# Patient Record
Sex: FEMALE | Race: WHITE | Employment: OTHER | URBAN - METROPOLITAN AREA
[De-identification: names, ages, dates, MRNs, and addresses within clinical notes are randomized per-mention and may not be internally consistent; named-entity substitution may affect disease eponyms.]

---

## 2017-02-20 ENCOUNTER — TELEPHONE (OUTPATIENT)
Age: 67
End: 2017-02-20

## 2017-03-29 ENCOUNTER — NURSE ONLY (OUTPATIENT)
Age: 67
End: 2017-03-29

## 2017-03-29 DIAGNOSIS — M81.0 OSTEOPOROSIS, POSTMENOPAUSAL: Primary | ICD-10-CM

## 2017-04-06 ENCOUNTER — NURSE ONLY (OUTPATIENT)
Age: 67
End: 2017-04-06

## 2017-04-06 DIAGNOSIS — M81.0 OSTEOPOROSIS, POSTMENOPAUSAL: ICD-10-CM

## 2017-04-06 PROCEDURE — 96372 THER/PROPH/DIAG INJ SC/IM: CPT | Performed by: INTERNAL MEDICINE

## 2017-10-02 ENCOUNTER — TELEPHONE (OUTPATIENT)
Age: 67
End: 2017-10-02

## 2017-10-09 ENCOUNTER — HOSPITAL ENCOUNTER (OUTPATIENT)
Dept: GENERAL RADIOLOGY | Age: 67
Discharge: HOME | End: 2017-10-09
Attending: INTERNAL MEDICINE | Admitting: INTERNAL MEDICINE

## 2017-10-09 ENCOUNTER — TELEPHONE (OUTPATIENT)
Dept: ENDOCRINOLOGY | Age: 67
End: 2017-10-09

## 2017-10-09 ENCOUNTER — OFFICE VISIT (OUTPATIENT)
Age: 67
End: 2017-10-09

## 2017-10-09 VITALS
BODY MASS INDEX: 30.35 KG/M2 | SYSTOLIC BLOOD PRESSURE: 116 MMHG | DIASTOLIC BLOOD PRESSURE: 72 MMHG | HEIGHT: 64 IN | WEIGHT: 177.8 LBS

## 2017-10-09 DIAGNOSIS — Z51.81 MEDICATION MONITORING ENCOUNTER: ICD-10-CM

## 2017-10-09 DIAGNOSIS — N20.0 KIDNEY STONE: ICD-10-CM

## 2017-10-09 DIAGNOSIS — M81.0 OSTEOPOROSIS, POSTMENOPAUSAL: Primary | ICD-10-CM

## 2017-10-09 DIAGNOSIS — E55.9 VITAMIN D DEFICIENCY: ICD-10-CM

## 2017-10-09 DIAGNOSIS — M81.0 OSTEOPOROSIS, POSTMENOPAUSAL: ICD-10-CM

## 2017-10-09 PROCEDURE — 77080 DXA BONE DENSITY AXIAL: CPT | Performed by: INTERNAL MEDICINE

## 2017-10-09 PROCEDURE — 99214 OFFICE O/P EST MOD 30 MIN: CPT | Performed by: INTERNAL MEDICINE

## 2017-10-09 NOTE — PROGRESS NOTES
[facility]) Osteoporosis and [address] [place] [address]., [address] [place] [address]   Phone [phone]  Fax [phone]    NAME: Diann Baker   : 1950   STUDY DATE: 10/09/2017     REFERRING PROVIDER: Denver Coleman MD    INDICATION(S) FOR PERFORMING THE STUDY:  osteoporosis, age-related (M81.0)    CLINICAL INFORMATION PROVIDED BY THE PATIENT: 45-year-old woman. She started natural menopause at age 49. No history of fragility fractures. She takes Arimidex (started ). She took Fosamax in , Boniva -2011 and alendronate tabs  and liquid  but had GI side effects. She is being treated with Prolia (started 2013). EQUIPMENT: Hologic Discovery. POSITIONING: Good   REGIONS OF INTEREST: Correct. ARTIFACTS: None. STUDY VALID? L2 and L4 were deleted because of degenerative change. T-scores  Initial study: 2005* spine L1-L2 -1.0 Lowest hip (right total hip) -1.9   Current study: 10/09/2017 spine L1+L3 -1.5 Lowest hip (right fem. neck) -1,9     The table below shows bone mineral density (grams/cm2), the appropriate measure for comparing serial scans. An increase or decrease is significant based on precision studies done at our center according to the ISCD protocol. PA spine Proximal Femur (right)   Date L1+L3 Fem. neck Trochanter Total hip   2005* NA 0.596 0.505 0.656   2007* Invalid 0.621 0.540 0.729   2009* Invalid 0.596 0.539 0.717   06/15/2011* Invalid 0.563 0.544 0.721   2012 0.738 0.544 0.591 0.701   2013 0.759 0.571 0.600 0.707   2014 0.768 0.603 0.621 0.737   2015 0.846 0.595 0.628 0.750   2016 0.825 0.617 0.630 0.768   10/09/2017       *Done with [employer] equipment, BMD converted to Hologic units    IMPRESSION:  BONE DENSITY IS LOW. BETWEEN  AND , BMD INCREASED IN THE TROCHANTER BUT DID NOT CHANGE SIGNIFICANTLY IN THE SPINE, FEMORAL NECK OR TOTAL HIP.  SINCE  IT INCREASED AT ALL SITES MEASURED. Consider repeating this study in 1-2 years to assess the patient's progress. _________________________________________________   Macario Robles MD, Director, [facility]) Osteoporosis and [address] [place] [address]

## 2017-10-09 NOTE — PROGRESS NOTES
[address]  [address] Suite [facility] [place] [address]  Phone [phone]  Fax [phone]    PATIENT NAME: Mona Sarmiento  YOB: 1950  INITIAL CONSULTATION: 11/02/2011  MOST RECENT VISIT: 09/26/2016  TODAYS VISIT:  10/09/2017. Labs @  06/2017     PROBLEMS:   Osteoporosis by DXA 06/2011, lowest T-score -2.5 in the right femoral neck    Low bone density by DXA 07/2005, lowest T-score -2.2 in the right femoral neck    BMD decreased 6714-0157, may be due to a technical error    No fractures    Natural menopause age 49 (2001)  Breast cancer diagnosed 2004, surgery, chemotherapy, radiation, Arimidex started 2005  Kidney stones x 5, first episode ~ age 51 (57), last episode ~ 2003     [address]:   Calcium: 900 mg/d diet + ~300 mg in multivitamin = ~1200 mg daily  Vitamin D:  2000 IU daily per Dr. Amauri Mancilla    25-OH D 36 ng/mL 05/2015  Exercise: walks 3 x weekly  Pharmacologic therapy: Prolia 60 mg SQ twice yearly started 08/2013    PREVIOUS MEDICATIONS FOR OSTEOPOROSIS:   Fosamax, Fosamax D 8151-6136, changed to convenient dosing  Boniva 2005-06/2011, recommended to stop by oncologist  Ergocalciferol 50,000 IU, started \"years ago, mostly monthly dosing, changed to cholecalciferol 2000 IU/d 04/2012 by Dr. Amauri Mancilla  Alendronate 70 mg weekly 11/2011-02/2012, stopped because of GI symptoms  Tried alendronate 70 mg tabs dissolved in water, 04/2012, 3 doses => GI problems    OTHER CURRENT MEDICATIONS (SELECTED):  Lovastatin, Prilosec, Arimidex, gabapentin, bupropion, Singulair  OTC MEDICATIONS:  Folic acid, ASA    CHIEF COMPLAINT:  Here for f/u of osteoporosis, use of aromatase inhibitor and kidney stones, monitoring treatment. No new related signs or symptoms. INTERVAL HISTORY:  See problem list for chronic/inactive conditions. She received Prolia without side effects. No falls, near-falls or fractures. Feels well overall.  No 0.8.  05/2015, PTH 15. 07/2015, RFP Ca 10.2, Cr 0.72. 05/2016 Ca 9.7 Cr 0.8. 08/2016 CBC Ca 9.4 Cr 0.8.  06/2017 Ca 10.0 Cr 0.9. Imaging. DXA printouts reviewed. 07/2015 CXR. 05/2017 LX XR.    ASSESSMENT:  Osteoporosis with bone density lower than desirable due in part to calcium malabsorption. Importantly she has had no fractures. She has been on Arimidex since 2005 and will remain on therapy was some time. She has not been able to tolerate oral meds for osteoporosis. BMD has been stable but considering use of Prilosec and Arimidex fracture risk is higher than estimated by [facility]. She is doing well with Prolia started 08/2013. PLANS:  OK to give Prolia today and continue Prolia 60 mg SQ twice yearly. Return with DXA in 1 year. I spent a total of 25 minutes face to face with this patient. Over 50% of that time was spent on counseling and care coordination. Please see assessment and plan for counseling and care coordination details. Kelli Robles MD, Director, [facility] [place]) Osteoporosis and Bone Health Services    CC: MD Inga Nichols MD

## 2017-10-11 ENCOUNTER — NURSE ONLY (OUTPATIENT)
Age: 67
End: 2017-10-11

## 2017-10-11 DIAGNOSIS — M81.0 OSTEOPOROSIS, POSTMENOPAUSAL: ICD-10-CM

## 2017-10-11 PROCEDURE — 96372 THER/PROPH/DIAG INJ SC/IM: CPT | Performed by: INTERNAL MEDICINE

## 2017-10-12 ENCOUNTER — PROCEDURE VISIT (OUTPATIENT)
Age: 67
End: 2017-10-12

## 2017-10-12 DIAGNOSIS — M81.0 OSTEOPOROSIS, POSTMENOPAUSAL: Primary | ICD-10-CM

## 2018-03-21 ENCOUNTER — TELEPHONE (OUTPATIENT)
Age: 68
End: 2018-03-21

## 2018-04-05 DIAGNOSIS — M81.0 OSTEOPOROSIS, POSTMENOPAUSAL: Primary | ICD-10-CM

## 2018-04-12 ENCOUNTER — TELEPHONE (OUTPATIENT)
Age: 68
End: 2018-04-12

## 2018-04-12 ENCOUNTER — NURSE ONLY (OUTPATIENT)
Age: 68
End: 2018-04-12

## 2018-04-12 DIAGNOSIS — M81.0 OSTEOPOROSIS, POSTMENOPAUSAL: Primary | ICD-10-CM

## 2018-04-12 PROCEDURE — 96372 THER/PROPH/DIAG INJ SC/IM: CPT | Performed by: INTERNAL MEDICINE

## 2018-09-28 DIAGNOSIS — M81.0 OSTEOPOROSIS, POSTMENOPAUSAL: Primary | ICD-10-CM

## 2018-10-15 ENCOUNTER — HOSPITAL ENCOUNTER (OUTPATIENT)
Dept: GENERAL RADIOLOGY | Age: 68
Discharge: HOME | End: 2018-10-15
Payer: MEDICARE

## 2018-10-15 ENCOUNTER — OFFICE VISIT (OUTPATIENT)
Dept: ENDOCRINOLOGY | Age: 68
End: 2018-10-15
Payer: COMMERCIAL

## 2018-10-15 ENCOUNTER — PROCEDURE VISIT (OUTPATIENT)
Dept: ENDOCRINOLOGY | Age: 68
End: 2018-10-15
Payer: COMMERCIAL

## 2018-10-15 VITALS
HEIGHT: 64 IN | DIASTOLIC BLOOD PRESSURE: 67 MMHG | SYSTOLIC BLOOD PRESSURE: 121 MMHG | BODY MASS INDEX: 28.24 KG/M2 | RESPIRATION RATE: 16 BRPM | HEART RATE: 90 BPM | OXYGEN SATURATION: 98 % | WEIGHT: 165.4 LBS

## 2018-10-15 DIAGNOSIS — M81.0 OSTEOPOROSIS, POSTMENOPAUSAL: Primary | ICD-10-CM

## 2018-10-15 DIAGNOSIS — M81.0 OSTEOPOROSIS, POSTMENOPAUSAL: ICD-10-CM

## 2018-10-15 DIAGNOSIS — N20.0 KIDNEY STONE: ICD-10-CM

## 2018-10-15 DIAGNOSIS — E55.9 VITAMIN D DEFICIENCY: ICD-10-CM

## 2018-10-15 DIAGNOSIS — Z51.81 MEDICATION MONITORING ENCOUNTER: ICD-10-CM

## 2018-10-15 PROCEDURE — 77080 DXA BONE DENSITY AXIAL: CPT | Performed by: INTERNAL MEDICINE

## 2018-10-15 PROCEDURE — 96372 THER/PROPH/DIAG INJ SC/IM: CPT | Performed by: INTERNAL MEDICINE

## 2018-10-15 PROCEDURE — 99214 OFFICE O/P EST MOD 30 MIN: CPT | Performed by: INTERNAL MEDICINE

## 2018-10-15 PROCEDURE — 77080 DXA BONE DENSITY AXIAL: CPT

## 2018-10-15 NOTE — PROGRESS NOTES
[address]  [address] [place] [address]  Phone [phone]  Fax [phone]    PATIENT NAME: Nicky TORRES  YOB: 1950  INITIAL CONSULTATION: 11/02/2011  MOST RECENT VISIT: 10/09/2017  TODAYS VISIT:  10/15/2018     Labs @  07/2018    PROBLEMS:   Osteoporosis by DXA 06/2011, lowest T-score -2.5 in the right femoral neck    Low bone density by DXA 07/2005, lowest T-score -2.2 in the right femoral neck    BMD decreased 0250-7301, may be due to a technical error    No fractures    Natural menopause age 49 (2001)  Breast cancer diagnosed 2004, surgery, chemotherapy, radiation, Arimidex started 2005  Kidney stones x 5, first episode ~ age 51 (57), last episode ~ 2003     [address]:   Calcium: 900 mg/d diet + ~300 mg in multivitamin = ~1200 mg daily  Vitamin D:  2000 IU daily per Dr. Brody Solares    25-OH D 51 ng/mL 10/2017  Exercise: walks 3 x weekly  Pharmacologic therapy: Prolia 60 mg SQ twice yearly started 08/2013    PREVIOUS MEDICATIONS FOR OSTEOPOROSIS:   Fosamax, Fosamax D 2570-1329, changed to convenient dosing  Boniva 2005-06/2011, recommended to stop by oncologist  Ergocalciferol 50,000 IU, started \"years ago, mostly monthly dosing, changed to cholecalciferol 2000 IU/d 04/2012 by Dr. Brody Solares  Alendronate 70 mg weekly 11/2011-02/2012, stopped because of GI symptoms  Tried alendronate 70 mg tabs dissolved in water, 04/2012, 3 doses => GI problems    OTHER CURRENT MEDICATIONS (SELECTED):  Lovastatin, Prilosec, Arimidex, gabapentin, bupropion, Singulair  OTC MEDICATIONS:  Folic acid, ASA    CHIEF COMPLAINT:  Here for f/u of osteoporosis, use of aromatase inhibitor and kidney stones, monitoring treatment. No new related signs or symptoms. INTERVAL HISTORY:  See problem list for chronic/inactive conditions. She received Prolia without side effects. No falls, near-falls or fractures. Feels well overall.  No new

## 2018-10-15 NOTE — PROGRESS NOTES
SINCE 2013, BEFORE STARTING PROLIA, BMD IS HIGHER NOW AT ALL SITES MEASURED. Consider repeating this study in 1-2 years to assess the patient's progress. _________________________________________________   Juan Robles MD, Director, [facility]) Osteoporosis and [address] [place] [address]

## 2019-03-29 ENCOUNTER — TELEPHONE (OUTPATIENT)
Dept: ENDOCRINOLOGY | Age: 69
End: 2019-03-29

## 2019-04-09 ENCOUNTER — TELEPHONE (OUTPATIENT)
Dept: ENDOCRINOLOGY | Age: 69
End: 2019-04-09

## 2019-04-09 NOTE — TELEPHONE ENCOUNTER
Patient said that [facility] called her and told her that they have not received an order for the prolia. Patient asked to be called.

## 2019-04-16 DIAGNOSIS — M81.0 OSTEOPOROSIS, POSTMENOPAUSAL: Primary | ICD-10-CM

## 2019-04-17 ENCOUNTER — NURSE ONLY (OUTPATIENT)
Dept: ENDOCRINOLOGY | Age: 69
End: 2019-04-17
Payer: COMMERCIAL

## 2019-04-17 DIAGNOSIS — M81.0 OSTEOPOROSIS, POSTMENOPAUSAL: ICD-10-CM

## 2019-04-17 PROCEDURE — 96372 THER/PROPH/DIAG INJ SC/IM: CPT | Performed by: INTERNAL MEDICINE

## 2019-04-17 NOTE — PATIENT INSTRUCTIONS
Informed patient if any signs of redness,rash,swelling or unusual symptoms occur, please contact the office. Prolia given per physician order. Patient supplied the Prolia. It is the patient's responsibility to notify the physician office of new insurance plan prior to appointment to prevent delay in treatment. Please send a copy of the front and back of the insurance card either by fax, mail or stop by the office.   [address] [phone] Lot [id number] exp 6/21  [facility]

## 2019-04-17 NOTE — PROGRESS NOTES
Informed patient if any signs of redness,rash,swelling or unusual symptoms occur, please contact the office. Prolia given per physician order. Patient supplied the Prolia. It is the patient's responsibility to notify the physician office of new insurance plan prior to appointment to prevent delay in treatment. Please send a copy of the front and back of the insurance card either by fax, mail or stop by the office.   [address] [phone] Lot [id number] exp 6/21  [facility]

## 2019-10-21 ENCOUNTER — PROCEDURE VISIT (OUTPATIENT)
Dept: ENDOCRINOLOGY | Age: 69
End: 2019-10-21
Payer: COMMERCIAL

## 2019-10-21 ENCOUNTER — TELEPHONE (OUTPATIENT)
Dept: ENDOCRINOLOGY | Age: 69
End: 2019-10-21

## 2019-10-21 ENCOUNTER — OFFICE VISIT (OUTPATIENT)
Dept: ENDOCRINOLOGY | Age: 69
End: 2019-10-21
Payer: COMMERCIAL

## 2019-10-21 ENCOUNTER — HOSPITAL ENCOUNTER (OUTPATIENT)
Dept: GENERAL RADIOLOGY | Age: 69
Discharge: HOME | End: 2019-10-21
Payer: MEDICARE

## 2019-10-21 VITALS
SYSTOLIC BLOOD PRESSURE: 118 MMHG | DIASTOLIC BLOOD PRESSURE: 66 MMHG | BODY MASS INDEX: 27.96 KG/M2 | HEIGHT: 64 IN | WEIGHT: 163.8 LBS

## 2019-10-21 DIAGNOSIS — N20.0 KIDNEY STONE: ICD-10-CM

## 2019-10-21 DIAGNOSIS — M81.0 OSTEOPOROSIS, POSTMENOPAUSAL: ICD-10-CM

## 2019-10-21 DIAGNOSIS — E55.9 VITAMIN D DEFICIENCY: ICD-10-CM

## 2019-10-21 DIAGNOSIS — M81.0 OSTEOPOROSIS, POSTMENOPAUSAL: Primary | ICD-10-CM

## 2019-10-21 DIAGNOSIS — Z51.81 MEDICATION MONITORING ENCOUNTER: ICD-10-CM

## 2019-10-21 PROCEDURE — 99214 OFFICE O/P EST MOD 30 MIN: CPT | Performed by: INTERNAL MEDICINE

## 2019-10-21 PROCEDURE — 96372 THER/PROPH/DIAG INJ SC/IM: CPT | Performed by: INTERNAL MEDICINE

## 2019-10-21 PROCEDURE — 77080 DXA BONE DENSITY AXIAL: CPT

## 2019-10-21 PROCEDURE — 77080 DXA BONE DENSITY AXIAL: CPT | Performed by: INTERNAL MEDICINE

## 2020-04-23 ENCOUNTER — NURSE ONLY (OUTPATIENT)
Dept: ENDOCRINOLOGY | Age: 70
End: 2020-04-23
Payer: COMMERCIAL

## 2020-04-23 PROCEDURE — 96372 THER/PROPH/DIAG INJ SC/IM: CPT | Performed by: INTERNAL MEDICINE

## 2020-09-01 RX ORDER — DENOSUMAB 60 MG/ML
60 INJECTION SUBCUTANEOUS ONCE
Qty: 1 ML | Refills: 0 | Status: SHIPPED | OUTPATIENT
Start: 2020-09-01 | End: 2020-09-01

## 2020-10-27 ENCOUNTER — HOSPITAL ENCOUNTER (OUTPATIENT)
Dept: GENERAL RADIOLOGY | Age: 70
Discharge: HOME | End: 2020-10-27
Payer: MEDICARE

## 2020-10-27 ENCOUNTER — PROCEDURE VISIT (OUTPATIENT)
Dept: ENDOCRINOLOGY | Age: 70
End: 2020-10-27

## 2020-10-27 ENCOUNTER — OFFICE VISIT (OUTPATIENT)
Dept: ENDOCRINOLOGY | Age: 70
End: 2020-10-27
Payer: MEDICARE

## 2020-10-27 VITALS
WEIGHT: 163.6 LBS | DIASTOLIC BLOOD PRESSURE: 60 MMHG | SYSTOLIC BLOOD PRESSURE: 100 MMHG | HEIGHT: 63 IN | BODY MASS INDEX: 28.99 KG/M2 | TEMPERATURE: 97.7 F

## 2020-10-27 PROCEDURE — 77080 DXA BONE DENSITY AXIAL: CPT

## 2020-10-27 PROCEDURE — 99214 OFFICE O/P EST MOD 30 MIN: CPT | Performed by: INTERNAL MEDICINE

## 2020-10-27 PROCEDURE — 77080 DXA BONE DENSITY AXIAL: CPT | Performed by: INTERNAL MEDICINE

## 2020-10-27 RX ORDER — DENOSUMAB 60 MG/ML
60 INJECTION SUBCUTANEOUS ONCE
Qty: 1 ML | Refills: 0 | Status: SHIPPED | OUTPATIENT
Start: 2020-10-27 | End: 2021-04-07

## 2020-10-27 RX ORDER — BUPROPION HYDROCHLORIDE 150 MG/1
TABLET ORAL
COMMUNITY
Start: 2020-09-11

## 2020-10-27 NOTE — RESULT ENCOUNTER NOTE
[facility]) Osteoporosis and [address] [facility] [address], [address] [place] [address]   Phone [phone]  Fax [phone]    NAME: Jenny Robbins   : 1950   STUDY DATE: 10/27/2020     REFERRING PROVIDER: Alcides Boggs MD    INDICATION(S) FOR PERFORMING THE STUDY:  osteoporosis, age-related (M81.0)    CLINICAL INFORMATION PROVIDED BY THE PATIENT: 81-year-old woman. She started natural menopause at age 49. No history of fragility fractures. She takes Arimidex (started ). She took Fosamax in , Boniva -2011 and alendronate tabs  and liquid  but had GI side effects. She is being treated with Prolia (started 2013). EQUIPMENT: H[employer] Discovery. POSITIONING: Good   REGIONS OF INTEREST: Correct. ARTIFACTS: None. STUDY VALID? L2 and L4 were deleted because of degenerative change. T-scores  Initial study: 2005* spine L1-L2 -2.4 Lowest hip (right total hip) -1.9   Current study: 10/27/2020 spine L1-L2 -0.9 Lowest hip (right fem. neck) -1.9     The table below shows bone mineral density (grams/cm2), the appropriate measure for comparing serial scans. An increase or decrease is significant based on precision studies done at our center according to the ISCD protocol. PA spine Proximal Femur (right)   Date L1-L2 Fem. neck Trochanter Total hip   2005* 0.762 0.596 0.505 0.656   2007* 0.794 0.621 0.540 0.729   2009* 0.753 0.596 0.539 0.717   06/15/2011* 0.811 0.563 0.544 0.721   2012 0.751 0.544 0.591 0.701   2013 0.723 0.571 0.600 0.707   2014 0.755 0.603 0.621 0.737   2015 0.850 0.595 0.628 0.750   2016 0.785 0.617 0.630 0.768   10/09/2017 0.809 0.634 0.669 0.780   10/15/2018 0.859 0.622 0.637 0.747   10/21/2019 0.845 0.644 0.640 0.748   10/27/2020 0.882 0.635 0.666 0.772   *Done with [employer] equipment, BMD converted to Hologic units    IMPRESSION:  BONE DENSITY IS LOW.   SINCE , BEFORE STARTING PROLIA, BMD IS HIGHER NOW AT ALL SITES MEASURED. SINCE THE PREVIOUS DXA, BMD INCREASED IN THE SPINE AND IS TRENDING UP IN THE TROCHANTER AND FEMORAL NECK. Consider repeating this study in 1-2 years to assess the patient's progress. _________________________________________________   Jet Robles MD, Director, [facility]) Osteoporosis and [address] [place] [address]

## 2020-10-27 NOTE — PROGRESS NOTES
[address] [facility]  [address] [facility] [place] [address]  Phone [phone]  Fax [phone]    PATIENT NAME: Bishop Dangelo TORRES  YOB: 1950  INITIAL CONSULTATION: 11/02/2011  MOST RECENT VISIT: 10/21/2019  TODAYS VISIT:  10/27/2020    Labs @  08/2020    PROBLEMS:   Osteoporosis by DXA 06/2011, lowest T-score -2.5 in the right femoral neck    Low bone density by DXA 07/2005, lowest T-score -2.2 in the right femoral neck    BMD decreased 6934-7350, may be due to a technical error    No fractures    Natural menopause age 49 (2001)  Breast cancer diagnosed 2004, surgery, chemotherapy, radiation, Arimidex 2005~2017  Kidney stones x 5, first episode ~ age 51 (1999), last episode ~ 2003 11/2011 U Ca 289* oxalate 20, citrate 1005 uric acid 663*     CURRENT MANAGEMENT FOR BONE HEALTH:   Calcium: 900 mg/d diet + ~300 mg in multivitamin = ~1200 mg daily  Vitamin D:  2000 IU daily per Dr. Susan Barlow    25-OH D 51 ng/mL 10/2017  Exercise: walks 3 x weekly  Pharmacologic therapy: Prolia 60 mg SQ twice yearly started 08/2013    PREVIOUS MEDICATIONS FOR OSTEOPOROSIS:   Fosamax, Fosamax D 6596-9736, changed to convenient dosing  Boniva 2005-06/2011, recommended to stop by oncologist  Ergocalciferol 50,000 IU, started \"years ago, mostly monthly dosing, changed to cholecalciferol 2000 IU/d 04/2012 by Dr. Susan Barlow  Alendronate 70 mg weekly 11/2011-02/2012, stopped because of GI symptoms  Tried alendronate 70 mg tabs dissolved in water, 04/2012, 3 doses => GI problems    OTHER CURRENT MEDICATIONS (SELECTED):  Lovastatin, Prilosec, Arimidex, gabapentin, bupropion, Singulair  OTC MEDICATIONS:  Folic acid, ASA    CHIEF COMPLAINT:  Here for f/u of osteoporosis, use of aromatase inhibitor and kidney stones, monitoring treatment. No new related signs or symptoms. INTERVAL HISTORY:  See problem list for chronic/inactive conditions. She received Prolia without side effects.  She fell (again) in tight quarters in a parking lot but no injury. Feels well overall. She has not passed any more kidney stones. FOR FULL DETAILS OF FAMILY HISTORY, PAST MEDICAL AND SURGICAL HISTORY, SOCIAL HISTORY, AND REVIEW OF SYSTEMS, SEE PATIENT QUESTIONNAIRE OF TODAY'S DATE. PHYSICAL EXAMINATION:  NEUROLOGIC EXAM: Able to rise from chair without using arms. No apparent focal motor or sensory deficit. Reflexes brisk and symmetric. Coordination appears normal.  MUSCULOSKELETAL EXAM: Gait: Intact without difficulty. Steady without assistance. Spine: Spinal contours are normal.  No spine tenderness to palpation or percussion. Ribs and pelvis: Ribs appear normal. Pelvis appears normal. Two finger spaces between ribs and pelvis. BONE DENSITY:  Most recent done here using Hologic equipment. T-scores  Initial study: 07/12/2005* spine L1-L2 -2.4 Lowest hip (right total hip) -1.9   Current study: 10/27/2020 spine L1-L2 -0.9 Lowest hip (right fem. neck) -1.9     The table below shows bone mineral density (grams/cm2), the appropriate measure for comparing serial scans. An increase or decrease is significant based on precision studies done at our center according to the ISCD protocol. PA spine Proximal Femur (right)   Date L1-L2 Fem. neck Trochanter Total hip   07/12/2005* 0.762 0.596 0.505 0.656   07/17/2007* 0.794 0.621 0.540 0.729   07/01/2009* 0.753 0.596 0.539 0.717   06/15/2011* 0.811 0.563 0.544 0.721   07/31/2012 0.751 0.544 0.591 0.701   08/19/2013 0.723 0.571 0.600 0.707   08/25/2014 0.755 0.603 0.621 0.737   08/31/2015 0.850 0.595 0.628 0.750   09/26/2016 0.785 0.617 0.630 0.768   10/09/2017 0.809 0.634 0.669 0.780   10/15/2018 0.859 0.622 0.637 0.747   10/21/2019 0.845 0.644 0.640 0.748   10/27/2020 0.882 0.635 0.666 0.772   *Done with [employer] equipment, BMD converted to Hologic units    IMPRESSION:  BONE DENSITY IS LOW.   SINCE 2013, BEFORE STARTING PROLIA, BMD IS HIGHER NOW AT ALL SITES MEASURED. SINCE THE PREVIOUS DXA, BMD INCREASED IN THE SPINE AND IS TRENDING UP IN THE TROCHANTER AND FEMORAL NECK. Labs:  11/2011,  UOX 20 VOL 1700 mL UCa 289.  07/2012 Ca 9.8, Cr 0.7, TSH 0.8.  05/2015, PTH 15. 07/2015, RFP Ca 10.2, Cr 0.72. 05/2016 Ca 9.7 Cr 0.8. 08/2016 CBC Ca 9.4 Cr 0.8.  06/2017 Ca 10.0 Cr 0.9. 07/2018 Ca 10.0 Cr 0.8. 03/2019 PTH 15. 08/2019 Ca 9.7 Cr 0.6.   08/2020 Ca 9.3 Cr 0.7. Imaging. DXA printouts reviewed. 07/2015 CXR. 05/2017 LX XR.    ASSESSMENT:  Osteoporosis with bone density lower than desirable due in part to calcium malabsorption. Importantly she has had no fractures. She has been on Arimidex since 2005 and will remain on therapy was some time. She has not been able to tolerate oral meds for osteoporosis. She is doing well with Prolia started 08/2013. PLANS:  Continue Prolia 60 mg SQ twice yearly. Advised to maintain high fluid intake. Return with DXA in 1 year. I spent a total of 25 minutes face to face with this patient. Over 50% of that time was spent on counseling and care coordination. Please see assessment and plan for counseling and care coordination details. Rose Robles MD, Director, [facility]) Osteoporosis and Bone Health Services    CC: Casper Callaway MD

## 2020-11-19 ENCOUNTER — NURSE ONLY (OUTPATIENT)
Dept: ENDOCRINOLOGY | Age: 70
End: 2020-11-19
Payer: MEDICARE

## 2020-11-19 ENCOUNTER — TELEPHONE (OUTPATIENT)
Dept: ENDOCRINOLOGY | Age: 70
End: 2020-11-19

## 2020-11-19 PROCEDURE — 96372 THER/PROPH/DIAG INJ SC/IM: CPT | Performed by: INTERNAL MEDICINE

## 2020-11-19 NOTE — TELEPHONE ENCOUNTER
[facility] Prolia [facility]. She is asking if we have received it yet.

## 2021-04-07 RX ORDER — DENOSUMAB 60 MG/ML
INJECTION SUBCUTANEOUS
Qty: 1 SYRINGE | Refills: 1 | Status: SHIPPED | OUTPATIENT
Start: 2021-04-07

## 2021-05-20 ENCOUNTER — NURSE ONLY (OUTPATIENT)
Dept: ENDOCRINOLOGY | Age: 71
End: 2021-05-20
Payer: MEDICARE

## 2021-05-20 DIAGNOSIS — M81.0 AGE-RELATED OSTEOPOROSIS WITHOUT CURRENT PATHOLOGICAL FRACTURE: Primary | ICD-10-CM

## 2021-05-20 PROCEDURE — 96372 THER/PROPH/DIAG INJ SC/IM: CPT | Performed by: INTERNAL MEDICINE

## 2021-10-11 ENCOUNTER — TELEPHONE (OUTPATIENT)
Dept: ENDOCRINOLOGY | Age: 71
End: 2021-10-11

## 2021-10-12 RX ORDER — DENOSUMAB 60 MG/ML
60 INJECTION SUBCUTANEOUS ONCE
Qty: 1 EACH | Refills: 0 | Status: SHIPPED | OUTPATIENT
Start: 2021-10-12 | End: 2021-10-12

## 2021-11-24 ENCOUNTER — NURSE ONLY (OUTPATIENT)
Dept: ENDOCRINOLOGY | Age: 71
End: 2021-11-24
Payer: MEDICARE

## 2021-11-24 DIAGNOSIS — M81.0 OSTEOPOROSIS, POSTMENOPAUSAL: ICD-10-CM

## 2021-11-24 PROCEDURE — 96372 THER/PROPH/DIAG INJ SC/IM: CPT | Performed by: INTERNAL MEDICINE

## 2021-11-24 NOTE — PROGRESS NOTES
Prolia injection given to Dr. Sanaz Kumar  [address]. [address] [phone]. Lot # [id number]  Exp     04/24       Injection given in right      Arm. Patient supplied prolia. Patient advised to wait in office 20 minutes in case of reaction such as SOB, rash, hives, itching.

## 2021-11-24 NOTE — PATIENT INSTRUCTIONS
It is the patient's responsibility to notify the physician office of new insurance plan prior to appointment to prevent delay in treatment. Please send a copy of the front and back of the insurance card either by fax, mail or stop by the office. Prolia injection given to Dr. Pat Linder  Patient. [address] [phone]. Lot # [id number]  Exp     04/24       Injection given in right      Arm. Patient supplied prolia. Patient advised to wait in office 20 minutes in case of reaction such as SOB, rash, hives, itching.

## 2021-11-24 NOTE — PROGRESS NOTES
It is the patient's responsibility to notify the physician office of new insurance plan prior to appointment to prevent delay in treatment. Please send a copy of the front and back of the insurance card either by fax, mail or stop by the office.

## 2021-12-13 ENCOUNTER — HOSPITAL ENCOUNTER (OUTPATIENT)
Dept: GENERAL RADIOLOGY | Age: 71
Discharge: HOME | End: 2021-12-13
Payer: MEDICARE

## 2021-12-13 ENCOUNTER — PROCEDURE VISIT (OUTPATIENT)
Dept: ENDOCRINOLOGY | Age: 71
End: 2021-12-13

## 2021-12-13 ENCOUNTER — OFFICE VISIT (OUTPATIENT)
Dept: ENDOCRINOLOGY | Age: 71
End: 2021-12-13
Payer: MEDICARE

## 2021-12-13 VITALS
DIASTOLIC BLOOD PRESSURE: 75 MMHG | BODY MASS INDEX: 28.56 KG/M2 | WEIGHT: 161.2 LBS | HEIGHT: 63 IN | SYSTOLIC BLOOD PRESSURE: 118 MMHG

## 2021-12-13 DIAGNOSIS — N20.0 KIDNEY STONE: ICD-10-CM

## 2021-12-13 DIAGNOSIS — M81.0 OSTEOPOROSIS, POSTMENOPAUSAL: ICD-10-CM

## 2021-12-13 DIAGNOSIS — Z51.81 MEDICATION MONITORING ENCOUNTER: ICD-10-CM

## 2021-12-13 DIAGNOSIS — M81.0 OSTEOPOROSIS, POSTMENOPAUSAL: Primary | ICD-10-CM

## 2021-12-13 DIAGNOSIS — E55.9 VITAMIN D DEFICIENCY: ICD-10-CM

## 2021-12-13 PROCEDURE — 77080 DXA BONE DENSITY AXIAL: CPT | Performed by: INTERNAL MEDICINE

## 2021-12-13 PROCEDURE — 77080 DXA BONE DENSITY AXIAL: CPT

## 2021-12-13 PROCEDURE — 99214 OFFICE O/P EST MOD 30 MIN: CPT | Performed by: INTERNAL MEDICINE

## 2021-12-13 NOTE — PROGRESS NOTES
[address]  [address], [address] [place] [address]  Phone [phone]  Fax [phone]    NAME: Parth Adorno OF BIRTH: 1950  INITIAL CONSULTATION: 11/02/2011  MOST RECENT VISIT: 10/27/2020  TODAYS VISIT:  12/13/2021    Labs @  11/2021    PROBLEMS:   Osteoporosis by DXA 06/2011, lowest T-score -2.5 in the right femoral neck    Low bone density by DXA 07/2005, lowest T-score -2.2 in the right femoral neck    BMD decreased 6733-8832, may be due to a technical error    No fractures    Natural menopause age 49 (2001)  Breast cancer diagnosed 2004, surgery, chemotherapy, radiation, Arimidex 2005~2017  Kidney stones x 5, first episode ~ age 51 (1999), last episode ~ 2003 11/2011 U Ca 289* oxalate 20, citrate 1005 uric acid 663*     CURRENT MANAGEMENT FOR BONE HEALTH:   Calcium: 900 mg/d diet + ~300 mg in multivitamin = ~1200 mg daily  Vitamin D:  2000 IU daily per Dr. Karri Simons    25-OH D 51 ng/mL 10/2017  Exercise: walks 3 x weekly  Pharmacologic therapy: Prolia 60 mg SQ twice yearly started 08/2013    PREVIOUS MEDICATIONS FOR OSTEOPOROSIS:   Fosamax, Fosamax D 3544-0653, changed to convenient dosing  Boniva 2005-06/2011, recommended to stop by oncologist  Ergocalciferol 50,000 IU, started \"years ago, mostly monthly dosing, changed to cholecalciferol 2000 IU/d 04/2012 by Dr. Karri Simons  Alendronate 70 mg weekly 11/2011-02/2012, stopped because of GI symptoms  Tried alendronate 70 mg tabs dissolved in water, 04/2012, 3 doses => GI problems    OTHER CURRENT MEDICATIONS (SELECTED):  Lovastatin, Prilosec, Arimidex, gabapentin, bupropion, Singulair  OTC MEDICATIONS:  Folic acid, ASA    CHIEF COMPLAINT:  Here for f/u of osteoporosis, use of aromatase inhibitor and kidney stones, monitoring treatment. No new related signs or symptoms. INTERVAL HISTORY:  See problem list for chronic/inactive conditions. She received Prolia without side effects.  Feels well overall. She has not passed any more kidney stones. She has had several falls at home, on the stairs. No injuries. Has added a bannister. FOR FULL DETAILS OF FAMILY HISTORY, PAST MEDICAL AND SURGICAL HISTORY, SOCIAL HISTORY, AND REVIEW OF SYSTEMS, SEE PATIENT QUESTIONNAIRE OF TODAY'S DATE. PHYSICAL EXAMINATION:  NEUROLOGIC EXAM: Able to rise from chair without using arms. No apparent focal motor or sensory deficit. Reflexes brisk and symmetric. Coordination appears normal.  MUSCULOSKELETAL EXAM: Gait: Intact without difficulty. Steady without assistance. Spine: Spinal contours are normal.  No spine tenderness to palpation or percussion. Ribs and pelvis: Ribs appear normal. Pelvis appears normal. Two finger spaces between ribs and pelvis. BONE DENSITY:  Most recent done here using Hologic equipment. T-scores  Initial study: 07/12/2005* spine L1-L2 -2.4 Lowest hip (right total hip) -1.9   Current study: 12/13/2021 spine L1-L2 -0.7 Lowest hip (right fem. neck) -2.2     The table below shows bone mineral density (grams/cm2), the appropriate measure for comparing serial scans. An increase or decrease is significant based on precision studies done at our center according to the ISCD protocol. PA spine Proximal Femur (right)   Date L1-L2 Fem. neck Trochanter Total hip   07/12/2005* 0.762 0.596 0.505 0.656   07/01/2009* 0.753 0.596 0.539 0.717   07/31/2012 0.751 0.544 0.591 0.701   08/19/2013 0.723 0.571 0.600 0.707   08/25/2014 0.755 0.603 0.621 0.737   09/26/2016 0.785 0.617 0.630 0.768   10/09/2017 0.809 0.634 0.669 0.780   10/21/2019 0.845 0.644 0.640 0.748   10/27/2020 0.882 0.635 0.666 0.772   12/13/2021 0.904 0.609 0.635 0.750   *Done with [employer] equipment, BMD converted to Hologic units    IMPRESSION:  BONE DENSITY IS LOW. SINCE 2013, BEFORE STARTING PROLIA, BMD IS HIGHER NOW AT ALL SITES MEASURED.   SINCE THE PREVIOUS DXA, BMD DID NOT CHANGE SIGNIFICANTLY IN THE SPINE OR RIGHT HIP BUT IS TRENDING UP IN THE SPINE. Labs:  11/2011,  UOX 20 VOL 1700 mL UCa 289.  07/2012 Ca 9.8, Cr 0.7, TSH 0.8.  05/2015, PTH 15. 07/2015, RFP Ca 10.2, Cr 0.72. 05/2016 Ca 9.7 Cr 0.8. 08/2016 CBC Ca 9.4 Cr 0.8.  06/2017 Ca 10.0 Cr 0.9. 07/2018 Ca 10.0 Cr 0.8. 03/2019 PTH 15. 08/2019 Ca 9.7 Cr 0.6.   08/2020 Ca 9.3 Cr 0.7. 11/2021 Ca 9.9 Cr 0.8. Imaging. DXA printouts reviewed. 07/2015 CXR. 04/2021 LS     ASSESSMENT:  Osteoporosis with bone density lower than desirable due in part to calcium malabsorption. Importantly she has had no fractures. She has been on Arimidex since 2005 and will remain on therapy was some time. She has not been able to tolerate oral meds for osteoporosis. She is doing well with Prolia started 08/2013. PLANS:  Continue Prolia 60 mg SQ twice yearly. Advised to maintain high fluid intake. Return with DXA in 1 year. Time spent today: 30-40 minutes. Jaz Robles MD, Director, [facility]) Osteoporosis and Bone Health Services    CC: Ember Stewart MD

## 2021-12-13 NOTE — RESULT ENCOUNTER NOTE
[facility]) Osteoporosis and [address] [facility] [address] [facility], [address] [place] [address],[place] [address]  Phone [phone]  Fax [phone]    NAME: Yordan Lopez   : 1950   STUDY DATE: 2021     REFERRING PROVIDER: Leobardo Rivera MD    INDICATION(S) FOR PERFORMING THE STUDY:  osteoporosis, age-related (M81.0)    CLINICAL INFORMATION PROVIDED BY THE PATIENT: 80-year-old woman. She started natural menopause at age 49. No history of fragility fractures. She took Arimidex 2931-8675. She took Fosamax in , Boniva -2011 and alendronate tabs  and liquid  but had GI side effects. Current treatment is Prolia (started 2013). EQUIPMENT: [employer]. POSITIONING: Good   REGIONS OF INTEREST: Correct. ARTIFACTS: None. STUDY VALID? L3 and L4 were deleted because of degenerative change. T-scores  Initial study: 2005* spine L1-L2 -2.4 Lowest hip (right total hip) -1.9   Current study: 2021 spine L1-L2 -0.7 Lowest hip (right fem. neck) -2.2     The table below shows bone mineral density (grams/cm2), the appropriate measure for comparing serial scans. An increase or decrease is significant based on precision studies done at our center according to the ISCD protocol. PA spine Proximal Femur (right)   Date L1-L2 Fem. neck Trochanter Total hip   2005* 0.762 0.596 0.505 0.656   2009* 0.753 0.596 0.539 0.717   2012 0.751 0.544 0.591 0.701   2013 0.723 0.571 0.600 0.707   2014 0.755 0.603 0.621 0.737   2016 0.785 0.617 0.630 0.768   10/09/2017 0.809 0.634 0.669 0.780   10/21/2019 0.845 0.644 0.640 0.748   10/27/2020 0.882 0.635 0.666 0.772   2021 0.904 0.609 0.635 0.750   *Done with [employer] equipment, BMD converted to Hologic units    IMPRESSION:  BONE DENSITY IS LOW. SINCE , BEFORE STARTING PROLIA, BMD IS HIGHER NOW AT ALL SITES MEASURED.   SINCE THE PREVIOUS DXA, BMD DID NOT CHANGE SIGNIFICANTLY IN THE SPINE OR RIGHT HIP BUT IS TRENDING UP IN THE SPINE. Consider repeating this study in 1-2 years to assess the patient's progress. _________________________________________________   Gaby Robles MD, Director, [facility]) Osteoporosis and [address] [place] [address]

## 2022-04-20 DIAGNOSIS — M81.0 OSTEOPOROSIS, POSTMENOPAUSAL: Primary | ICD-10-CM

## 2022-04-20 RX ORDER — DENOSUMAB 60 MG/ML
60 INJECTION SUBCUTANEOUS ONCE
Qty: 1 ML | Refills: 0 | Status: SHIPPED | OUTPATIENT
Start: 2022-04-20 | End: 2022-04-20

## 2022-05-11 ENCOUNTER — TELEPHONE (OUTPATIENT)
Dept: ENDOCRINOLOGY | Age: 72
End: 2022-05-11

## 2022-05-24 ENCOUNTER — TELEPHONE (OUTPATIENT)
Dept: ENDOCRINOLOGY | Age: 72
End: 2022-05-24

## 2022-05-24 NOTE — TELEPHONE ENCOUNTER
Medication:   Requested Prescriptions      No prescriptions requested or ordered in this encounter       Last Filled:      Patient Phone Number: [phone] (home) [phone] (work)    Last appt: Visit date not found   Next appt: 5/26/2022    Last Labs DM: No results found for: LABA1C  Last Lipid: No results found for: CHOL, TRIG, HDL, LDLCALC  Last PSA: No results found for: PSA  Last Thyroid: No results found for: TSH, FT3, F8WSJOB, T4FREE, Y6WAXYL

## 2022-05-24 NOTE — TELEPHONE ENCOUNTER
Patient has an appointment on Thursday for a Prolia injection. States she has not heard from [employer] about delivery of the medication. Please confirm if this has been received and call patient with an update. Thanks!

## 2022-05-27 ENCOUNTER — NURSE ONLY (OUTPATIENT)
Dept: ENDOCRINOLOGY | Age: 72
End: 2022-05-27
Payer: MEDICARE

## 2022-05-27 DIAGNOSIS — M81.0 OSTEOPOROSIS, POSTMENOPAUSAL: Primary | ICD-10-CM

## 2022-05-27 PROCEDURE — 96372 THER/PROPH/DIAG INJ SC/IM: CPT | Performed by: INTERNAL MEDICINE

## 2022-05-27 NOTE — PROGRESS NOTES
Prolia injection given to Dr. Art Hinkle         Patient. Fiona Griffin [phone]. Lot #    [id number]           Exp:      07/24     Injection given in  Right        Arm. Patient advised to wait in office 20 minutes in case of reaction such as SOB, rash, hives, itching.      Claire Davison

## 2022-11-14 ENCOUNTER — TELEPHONE (OUTPATIENT)
Dept: ENDOCRINOLOGY | Age: 72
End: 2022-11-14

## 2022-11-14 DIAGNOSIS — M81.0 OSTEOPOROSIS, POSTMENOPAUSAL: Primary | ICD-10-CM

## 2022-11-14 RX ORDER — DENOSUMAB 60 MG/ML
INJECTION SUBCUTANEOUS
Qty: 1 ML | Refills: 0 | Status: SHIPPED | OUTPATIENT
Start: 2022-11-14

## 2022-11-14 NOTE — TELEPHONE ENCOUNTER
Medication:   Requested Prescriptions     Pending Prescriptions Disp Refills    PROLIA 60 MG/ML SOSY SC injection [Pharmacy Med Name: Rosa Mustard 60MG/ML, 1ML PFS] 1 mL      Sig: INJECT 1 ML (60 MG) UNDER THE SKIN ONCE FOR 1 DOSE       Last Filled:      Patient Phone Number: [phone] (home) [phone] (work)    Last appt: 5/27/22  Next appt: Visit date not found    Last Labs DM: No results found for: LABA1C  Last Lipid: No results found for: CHOL, TRIG, HDL, LDLCALC  Last PSA: No results found for: PSA  Last Thyroid: No results found for: TSH, FT3, Q6MSPYP, T4FREE, O6IXRGE

## 2023-01-03 ENCOUNTER — OFFICE VISIT (OUTPATIENT)
Dept: ENDOCRINOLOGY | Age: 73
End: 2023-01-03
Payer: MEDICARE

## 2023-01-03 ENCOUNTER — HOSPITAL ENCOUNTER (OUTPATIENT)
Dept: GENERAL RADIOLOGY | Age: 73
Discharge: HOME | End: 2023-01-03
Payer: MEDICARE

## 2023-01-03 ENCOUNTER — PROCEDURE VISIT (OUTPATIENT)
Dept: ENDOCRINOLOGY | Age: 73
End: 2023-01-03

## 2023-01-03 VITALS
BODY MASS INDEX: 29.34 KG/M2 | HEIGHT: 63 IN | SYSTOLIC BLOOD PRESSURE: 102 MMHG | DIASTOLIC BLOOD PRESSURE: 74 MMHG | WEIGHT: 165.6 LBS

## 2023-01-03 DIAGNOSIS — M81.0 OSTEOPOROSIS, POSTMENOPAUSAL: Primary | ICD-10-CM

## 2023-01-03 DIAGNOSIS — M81.0 OSTEOPOROSIS, POSTMENOPAUSAL: ICD-10-CM

## 2023-01-03 DIAGNOSIS — N20.0 KIDNEY STONE: ICD-10-CM

## 2023-01-03 DIAGNOSIS — Z51.81 MEDICATION MONITORING ENCOUNTER: ICD-10-CM

## 2023-01-03 PROCEDURE — 77080 DXA BONE DENSITY AXIAL: CPT | Performed by: INTERNAL MEDICINE

## 2023-01-03 PROCEDURE — 96372 THER/PROPH/DIAG INJ SC/IM: CPT | Performed by: INTERNAL MEDICINE

## 2023-01-03 PROCEDURE — 77080 DXA BONE DENSITY AXIAL: CPT

## 2023-01-03 PROCEDURE — 1123F ACP DISCUSS/DSCN MKR DOCD: CPT | Performed by: INTERNAL MEDICINE

## 2023-01-03 PROCEDURE — 99214 OFFICE O/P EST MOD 30 MIN: CPT | Performed by: INTERNAL MEDICINE

## 2023-01-03 NOTE — RESULT ENCOUNTER NOTE
[facility]) Osteoporosis and [address] [facility] [address] [facility], [address] [place] [address],[place] [address]  Phone [phone]  Fax [phone]    NAME: Gordy Moses   : 1950   STUDY DATE: 2023     REFERRING PROVIDER: Sahil Connell MD    INDICATION(S) FOR PERFORMING THE STUDY:  osteoporosis, age-related (M81.0)    CLINICAL INFORMATION PROVIDED BY THE PATIENT: 78-year-old woman. She started natural menopause at age 49. No history of fragility fractures. She took Arimidex 8891-8087. She took Fosamax in , Boniva -2011 and alendronate tabs  and liquid  but had GI side effects. Current treatment is Prolia (started 2013). EQUIPMENT: [employer]. POSITIONING: Good   REGIONS OF INTEREST: Correct. ARTIFACTS: None. STUDY VALID? L3 and L4 were deleted because of degenerative change. T-scores  Initial study: 2005* spine L1-L2 -2.4 Lowest hip (right total hip) -1.9   Current study: 2023 spine L1-L2 -1.1 Lowest hip (right fem. neck) -2.0     The table below shows bone mineral density (grams/cm2), the appropriate measure for comparing serial scans. An increase or decrease is significant based on precision studies done at our center according to the ISCD protocol. PA spine Proximal Femur (right)   Date L1-L2 Fem. neck Trochanter Total hip   2005* 0.762 0.596 0.505 0.656   2009* 0.753 0.596 0.539 0.717   2012 0.751 0.544 0.591 0.701   2013 0.723 0.571 0.600 0.707   2014 0.755 0.603 0.621 0.737   2016 0.785 0.617 0.630 0.768   10/09/2017 0.809 0.634 0.669 0.780   10/21/2019 0.845 0.644 0.640 0.748   10/27/2020 0.882 0.635 0.666 0.772   2021 0.609 0.635 0.750   2023 0.862 0.625 0.628 0.742   *Done with [employer] equipment, BMD converted to Hologic units    IMPRESSION:  BONE DENSITY IS LOW. SINCE , BEFORE STARTING PROLIA, BMD IS HIGHER NOW AT ALL SITES MEASURED.   SINCE THE LAST DXA, BMD DID NOT CHANGE SIGNIFICANTLY IN THE RIGHT HIP; IT IS LOWER IN THE SPINE BUT SIMILAR NOW TO 2020. Consider repeating this study in 1-2 years to assess the patient's progress. _________________________________________________   James Robles MD, Director, [facility]) Osteoporosis and [address] [place] [address] 22.6

## 2023-01-03 NOTE — PROGRESS NOTES
[address]  [address] [facility] [facility]  Phone [phone]  Fax [phone]    NAME: Chapito Arcos OF BIRTH: 1950  INITIAL CONSULTATION: 11/02/2011  MOST RECENT VISIT: 12/13/2021  TODAY'S VISIT:  01/03/2023    Labs @ [place] 02/2022    PROBLEMS:   Osteoporosis by DXA 06/2011, lowest T-score -2.5 in the right femoral neck    Low bone density by DXA 07/2005, lowest T-score -2.2 in the right femoral neck    BMD decreased 6039-4900, may be due to a technical error    No fractures    Natural menopause age 49 (2001)  Breast cancer diagnosed 2004, surgery, chemotherapy, radiation, Arimidex 2005~2017  Kidney stones x 5, first episode ~ age 51 (1999), last episode ~ 2003 11/2011 U Ca 289* oxalate 20, citrate 1005 uric acid 663*  NEW 2021/2022 - right hip pain, probably sciatica     CURRENT MANAGEMENT FOR BONE HEALTH:   Calcium: 900 mg/d diet + ~300 mg in multivitamin = ~1200 mg daily  Vitamin D:  2000 IU daily per Dr. Jojo Goodwin    25-OH D 45 ng/mL 02/2022  Exercise: walks 3 x weekly  Pharmacologic therapy: Prolia 60 mg SQ twice yearly started 08/2013    PREVIOUS MEDICATIONS FOR OSTEOPOROSIS:   Fosamax, Fosamax D 8414-9408, changed to convenient dosing  Boniva 2005-06/2011, recommended to stop by oncologist  Ergocalciferol 50,000 IU, started \"years ago, mostly monthly dosing, changed to cholecalciferol 2000 IU/d 04/2012 by Dr. Jojo Goodwin  Alendronate 70 mg weekly 11/2011-02/2012, stopped because of GI symptoms  Tried alendronate 70 mg tabs dissolved in water, 04/2012, 3 doses => GI problems    OTHER CURRENT MEDICATIONS (SELECTED):  Lovastatin, Prilosec, Arimidex, gabapentin, bupropion, Singulair  OTC MEDICATIONS:  Folic acid, ASA    CHIEF COMPLAINT:  Here for f/u of osteoporosis, use of aromatase inhibitor and kidney stones, monitoring treatment. No new related signs or symptoms.     INTERVAL HISTORY:  See problem list for chronic/inactive conditions. She received Prolia without side effects. Feels well overall. She has not passed any more kidney stones. No falls, near-falls or fractures. FOR FULL DETAILS OF FAMILY HISTORY, PAST MEDICAL AND SURGICAL HISTORY, SOCIAL HISTORY, AND REVIEW OF SYSTEMS, SEE PATIENT QUESTIONNAIRE OF TODAY'S DATE. PHYSICAL EXAMINATION:  NEUROLOGIC EXAM: Able to rise from chair without using arms. No apparent focal motor or sensory deficit. Reflexes brisk and symmetric. Coordination appears normal.  MUSCULOSKELETAL EXAM: Gait: Intact without difficulty. Steady without assistance. Spine: Spinal contours are normal.  No spine tenderness to palpation or percussion. Ribs and pelvis: Ribs appear normal. Pelvis appears normal. Two finger spaces between ribs and pelvis. BONE DENSITY:  Most recent done here using Hologic equipment. T-scores  Initial study: 07/12/2005* spine L1-L2 -2.4 Lowest hip (right total hip) -1.9   Current study: 01/03/2023 spine L1-L2 -1.1 Lowest hip (right fem. neck) -2.0     The table below shows bone mineral density (grams/cm2), the appropriate measure for comparing serial scans. An increase or decrease is significant based on precision studies done at our center according to the ISCD protocol. PA spine Proximal Femur (right)   Date L1-L2 Fem. neck Trochanter Total hip   07/12/2005* 0.762 0.596 0.505 0.656   07/01/2009* 0.753 0.596 0.539 0.717   07/31/2012 0.751 0.544 0.591 0.701   08/19/2013 0.723 0.571 0.600 0.707   08/25/2014 0.755 0.603 0.621 0.737   09/26/2016 0.785 0.617 0.630 0.768   10/09/2017 0.809 0.634 0.669 0.780   10/21/2019 0.845 0.644 0.640 0.748   10/27/2020 0.882 0.635 0.666 0.772   12/13/2021 0.609 0.635 0.750   01/03/2023 0.862 0.625 0.628 0.742   *Done with [employer] equipment, BMD converted to Hologic units    IMPRESSION:  BONE DENSITY IS LOW. SINCE 2013, BEFORE STARTING PROLIA, BMD IS HIGHER NOW AT ALL SITES MEASURED.   SINCE THE LAST DXA, BMD DID NOT CHANGE SIGNIFICANTLY IN THE RIGHT HIP; IT IS LOWER IN THE SPINE BUT SIMILAR NOW TO 2020. Labs:  11/2011,  UOX 20 VOL 1700 mL UCa 289.  07/2012 Ca 9.8, Cr 0.7, TSH 0.8.  05/2015, PTH 15. 07/2015, RFP Ca 10.2, Cr 0.72. 05/2016 Ca 9.7 Cr 0.8. 08/2016 CBC Ca 9.4 Cr 0.8.  06/2017 Ca 10.0 Cr 0.9. 07/2018 Ca 10.0 Cr 0.8. 03/2019 PTH 15. 08/2019 Ca 9.7 Cr 0.6.   08/2020 Ca 9.3 Cr 0.7. 11/2021 Ca 9.9 Cr 0.8. 02/2022 Ca 9.4 Cr 0.8. Imaging. DXA printouts reviewed. 07/2015 CXR. 07/2022 LS     ASSESSMENT:  Osteoporosis with bone density lower than desirable due in part to calcium malabsorption. Importantly she has had no fractures. She took Arimidex 3634-9856. She could not tolerate oral meds for osteoporosis. She is doing well with Prolia started 08/2013. PLANS:  Continue Prolia 60 mg SQ twice yearly. Advised to maintain high fluid intake. Return with DXA in 1 year. Time spent today: 30-39 minutes. Brent Phalen Watts MD, Director, [facility]) Osteoporosis and Bone Health Services    CC: Eduar Ortez MD

## 2023-05-11 ENCOUNTER — TELEPHONE (OUTPATIENT)
Dept: ENDOCRINOLOGY | Age: 73
End: 2023-05-11

## 2023-05-16 DIAGNOSIS — M81.0 OSTEOPOROSIS, POSTMENOPAUSAL: ICD-10-CM

## 2023-05-17 RX ORDER — DENOSUMAB 60 MG/ML
INJECTION SUBCUTANEOUS
Qty: 1 ML | Refills: 0 | Status: SHIPPED | OUTPATIENT
Start: 2023-05-17

## 2023-07-19 ENCOUNTER — NURSE ONLY (OUTPATIENT)
Dept: ENDOCRINOLOGY | Age: 73
End: 2023-07-19
Payer: MEDICARE

## 2023-07-19 ENCOUNTER — TELEPHONE (OUTPATIENT)
Dept: ENDOCRINOLOGY | Age: 73
End: 2023-07-19

## 2023-07-19 DIAGNOSIS — M81.0 OSTEOPOROSIS, POSTMENOPAUSAL: Primary | ICD-10-CM

## 2023-07-19 PROCEDURE — 96372 THER/PROPH/DIAG INJ SC/IM: CPT | Performed by: INTERNAL MEDICINE

## 2023-07-19 NOTE — TELEPHONE ENCOUNTER
Pt is scheduled for Prolia today @ 11:45am and daughter is calling and asking if she should get Prolia today because she does have a pic line in both arms getting 24hr antibiotics from a procedure she had done    CB# [phone]  Julienne-daughter

## 2023-07-19 NOTE — TELEPHONE ENCOUNTER
Called and spoke with daughter and informed daughter I spoke with Dr. Herbie Cesar and patient can still get her Prolia injection. Daughter verbalized understanding.

## 2023-12-09 DIAGNOSIS — M81.0 OSTEOPOROSIS, POSTMENOPAUSAL: Primary | ICD-10-CM

## 2023-12-11 RX ORDER — DENOSUMAB 60 MG/ML
INJECTION SUBCUTANEOUS
Qty: 1 ML | Refills: 0 | Status: SHIPPED | OUTPATIENT
Start: 2023-12-11

## 2023-12-11 NOTE — TELEPHONE ENCOUNTER
Medication:   Requested Prescriptions     Pending Prescriptions Disp Refills    PROLIA 60 MG/ML SOSY SC injection [Pharmacy Med Name: Lu Bunch 60MG/ML, 1ML PFS] 1 mL      Sig: INJECT 1 ML (60 MG) UNDER THE SKIN EVERY 6 MONTHS       Last Filled:      Patient Phone Number: [phone] (home) [phone] (work)    Last appt: 1/3/2023   Next appt: 2/12/2024    Last Labs DM: No results found for: \"LABA1C\"  Last Lipid: No results found for: \"CHOL\", \"TRIG\", \"HDL\", \"LDLCALC\"  Last PSA: No results found for: \"PSA\"  Last Thyroid: No results found for: \"TSH\", \"FT3\", \"N1RIWHI\", \"T4FREE\", \"A8CLBLR\"

## 2023-12-14 ENCOUNTER — TELEPHONE (OUTPATIENT)
Dept: ENDOCRINOLOGY | Age: 73
End: 2023-12-14

## 2024-05-20 ENCOUNTER — TELEPHONE (OUTPATIENT)
Dept: ENDOCRINOLOGY | Age: 74
End: 2024-05-20

## 2024-05-20 NOTE — TELEPHONE ENCOUNTER
LVM for patient to see if the T-scores would enough to send in last office note to [facility] or do they need a print out of last dexa. They will need to call [phone] for printout. Last office note & T-scores faxed.

## 2024-05-20 NOTE — TELEPHONE ENCOUNTER
Pat with [facility] called and needs last dexa and chart notes faxed to [phone] jose Stewart

## 2024-05-20 NOTE — TELEPHONE ENCOUNTER
Numeric DXA results are always in my office notes. If they want printouts, they need to call the DXA office.

## 2024-06-13 DIAGNOSIS — M81.0 OSTEOPOROSIS, POSTMENOPAUSAL: ICD-10-CM

## 2024-06-13 NOTE — TELEPHONE ENCOUNTER
[employer] SOB completed. PA approval on file through 12/13/24.    Please submit prescription to [facility].

## 2024-06-14 RX ORDER — DENOSUMAB 60 MG/ML
INJECTION SUBCUTANEOUS
Qty: 1 ML | Refills: 0 | Status: SHIPPED | OUTPATIENT
Start: 2024-06-14

## 2024-06-24 ENCOUNTER — TELEPHONE (OUTPATIENT)
Dept: ENDOCRINOLOGY | Age: 74
End: 2024-06-24

## 2024-06-24 NOTE — TELEPHONE ENCOUNTER
LVM for patient to contact [facility] to give permission to ship Prolia to our office. Patient scheduled 6/25/24. [facility] phone # [phone]

## 2024-06-25 ENCOUNTER — OFFICE VISIT (OUTPATIENT)
Dept: ENDOCRINOLOGY | Age: 74
End: 2024-06-25
Payer: MEDICARE

## 2024-06-25 ENCOUNTER — HOSPITAL ENCOUNTER (OUTPATIENT)
Dept: GENERAL RADIOLOGY | Age: 74
Discharge: HOME | End: 2024-06-25
Payer: MEDICARE

## 2024-06-25 VITALS — HEIGHT: 62 IN | BODY MASS INDEX: 30.8 KG/M2 | WEIGHT: 167.4 LBS

## 2024-06-25 DIAGNOSIS — M81.0 OSTEOPOROSIS, POSTMENOPAUSAL: ICD-10-CM

## 2024-06-25 DIAGNOSIS — Z51.81 MEDICATION MONITORING ENCOUNTER: ICD-10-CM

## 2024-06-25 DIAGNOSIS — N20.0 KIDNEY STONE: ICD-10-CM

## 2024-06-25 DIAGNOSIS — M81.0 OSTEOPOROSIS, POSTMENOPAUSAL: Primary | ICD-10-CM

## 2024-06-25 PROCEDURE — 77080 DXA BONE DENSITY AXIAL: CPT

## 2024-06-25 PROCEDURE — 96372 THER/PROPH/DIAG INJ SC/IM: CPT | Performed by: INTERNAL MEDICINE

## 2024-06-25 PROCEDURE — 99214 OFFICE O/P EST MOD 30 MIN: CPT | Performed by: INTERNAL MEDICINE

## 2024-06-25 PROCEDURE — 1123F ACP DISCUSS/DSCN MKR DOCD: CPT | Performed by: INTERNAL MEDICINE

## 2024-06-25 RX ORDER — LANSOPRAZOLE 30 MG/1
CAPSULE, DELAYED RELEASE ORAL DAILY
COMMUNITY

## 2024-06-25 RX ORDER — MECLIZINE HYDROCHLORIDE 25 MG/1
TABLET ORAL
COMMUNITY
Start: 2023-06-08

## 2024-06-25 RX ORDER — SERTRALINE HYDROCHLORIDE 100 MG/1
TABLET, FILM COATED ORAL
COMMUNITY

## 2024-06-25 RX ORDER — HYDROXYCHLOROQUINE SULFATE 200 MG/1
200 TABLET, FILM COATED ORAL 2 TIMES DAILY
COMMUNITY
Start: 2022-08-21

## 2024-06-25 RX ORDER — DICLOFENAC SODIUM 75 MG/1
1 TABLET, DELAYED RELEASE ORAL 2 TIMES DAILY PRN
COMMUNITY
Start: 2023-12-02

## 2024-06-25 RX ORDER — DONEPEZIL HYDROCHLORIDE 10 MG/1
10 TABLET, FILM COATED ORAL DAILY
COMMUNITY
Start: 2024-06-10

## 2024-06-25 NOTE — PROGRESS NOTES
[facility] OSTEOPOROSIS AND BONE HEALTH SERVICES  [address]  [facility] [address]  Phone [phone]  Fax [phone]    NAME: SHANON TORRES  YOB: 1950  INITIAL CONSULTATION: 11/02/2011  MOST RECENT VISIT: 01/03/2023  TODAY'S VISIT:  06/25/2024    Labs @ [place] 05/2024    PROBLEMS:   Osteoporosis by DXA 06/2011, lowest T-score -2.5 in the right femoral neck    Low bone density by DXA 07/2005, lowest T-score -2.2 in the right femoral neck    BMD decreased 6503-1021, may be due to a technical error    No fractures    Natural menopause age 51 (2001)  Breast cancer diagnosed 2004, surgery, chemotherapy, radiation, Arimidex 2005~2017  Kidney stones x 5, first episode ~ age 49 (1999), last episode ~ 2003 11/2011 U Ca 289* oxalate 20, citrate 1005 uric acid 663*  NEW 2021/2022 - right hip pain, probably sciatica     CURRENT MANAGEMENT FOR BONE HEALTH:   Calcium: 900 mg/d diet + ~300 mg in multivitamin = ~1200 mg daily  Vitamin D:  2000 IU daily per Dr. Wilson    25-OH D 45 ng/mL 02/2022  Exercise: walks 3 x weekly  Pharmacologic therapy: Prolia 60 mg SQ twice yearly started 08/2013 (missed 01/2024), resumed 06/2024    PREVIOUS MEDICATIONS FOR OSTEOPOROSIS:   Fosamax, Fosamax D 3774-3854, changed to convenient dosing  Boniva 2005-06/2011, recommended to stop by oncologist  Ergocalciferol 50,000 IU, started \"years ago,” mostly monthly dosing, changed to cholecalciferol 2000 IU/d 04/2012 by Dr. Wilson  Alendronate 70 mg weekly 11/2011-02/2012, stopped because of GI symptoms  Tried alendronate 70 mg tabs dissolved in water, 04/2012, 3 doses => GI problems    OTHER CURRENT MEDICATIONS (SELECTED):  Lovastatin, Prilosec, Arimidex, gabapentin, bupropion, Singulair  OTC MEDICATIONS:  Folic acid, ASA    CHIEF COMPLAINT:  Here for f/u of osteoporosis, use of aromatase inhibitor and kidney stones, monitoring treatment.  No new related signs or symptoms.    INTERVAL HISTORY:  See problem

## 2024-07-10 DIAGNOSIS — M81.0 OSTEOPOROSIS, POSTMENOPAUSAL: Primary | ICD-10-CM

## 2024-07-10 RX ORDER — DENOSUMAB 60 MG/ML
INJECTION SUBCUTANEOUS
Qty: 1 ML | Refills: 0 | Status: SHIPPED | OUTPATIENT
Start: 2024-07-10

## 2024-12-02 ENCOUNTER — TELEPHONE (OUTPATIENT)
Dept: ENDOCRINOLOGY | Age: 74
End: 2024-12-02

## 2024-12-04 ENCOUNTER — TELEPHONE (OUTPATIENT)
Dept: ENDOCRINOLOGY | Age: 74
End: 2024-12-04

## 2024-12-04 DIAGNOSIS — M81.0 OSTEOPOROSIS, POSTMENOPAUSAL: ICD-10-CM

## 2024-12-05 NOTE — TELEPHONE ENCOUNTER
Submitted PA for Prolia  Via [facility] Key: [id number]  STATUS: PENDING.    Follow up done daily; if no decision with in three days we will refax.  If another three days goes by with no decision will call the insurance for status.

## 2024-12-06 RX ORDER — DENOSUMAB 60 MG/ML
INJECTION SUBCUTANEOUS
Qty: 1 ML | Refills: 0 | Status: SHIPPED | OUTPATIENT
Start: 2024-12-06

## 2024-12-17 ENCOUNTER — TELEPHONE (OUTPATIENT)
Dept: ENDOCRINOLOGY | Age: 74
End: 2024-12-17

## 2024-12-19 ENCOUNTER — TELEPHONE (OUTPATIENT)
Dept: ENDOCRINOLOGY | Age: 74
End: 2024-12-19

## 2024-12-19 NOTE — TELEPHONE ENCOUNTER
Spoke with [facility] for prolia delivery change from 12/27/24 to 12/26/24 per request with confirmation.

## 2025-01-03 ENCOUNTER — NURSE ONLY (OUTPATIENT)
Dept: ENDOCRINOLOGY | Age: 75
End: 2025-01-03
Payer: MEDICARE

## 2025-01-03 DIAGNOSIS — M81.0 OSTEOPOROSIS, POSTMENOPAUSAL: Primary | ICD-10-CM

## 2025-01-03 PROCEDURE — 96372 THER/PROPH/DIAG INJ SC/IM: CPT | Performed by: INTERNAL MEDICINE

## 2025-01-03 NOTE — PROGRESS NOTES
Patient supplied the Prolia.Informed patient if any signs of redness,rash,swelling or unusual symptoms occur, please contact the office. Prolia given per physician order.   head injury/yesterday

## 2025-06-06 DIAGNOSIS — M81.0 OSTEOPOROSIS, POSTMENOPAUSAL: ICD-10-CM

## 2025-06-09 ENCOUNTER — TELEPHONE (OUTPATIENT)
Dept: ENDOCRINOLOGY | Age: 75
End: 2025-06-09

## 2025-06-10 ENCOUNTER — TELEPHONE (OUTPATIENT)
Dept: ENDOCRINOLOGY | Age: 75
End: 2025-06-10

## 2025-06-10 DIAGNOSIS — M81.0 OSTEOPOROSIS, POSTMENOPAUSAL: Primary | ICD-10-CM

## 2025-06-10 NOTE — TELEPHONE ENCOUNTER
Duplicate Request    Prolia approved     Approval scanned in media     If this requires a response please respond to the pool ( P MHCX PSC MEDICATION PRE-AUTH).       Thank you please advise patient.

## 2025-06-11 RX ORDER — DENOSUMAB 60 MG/ML
INJECTION SUBCUTANEOUS
Qty: 1 ML | Refills: 0 | Status: SHIPPED | OUTPATIENT
Start: 2025-06-11

## 2025-06-11 NOTE — TELEPHONE ENCOUNTER
Submitted PA for Prolia  Via [facility] Key: [id number] STATUS: PENDING.    Follow up done daily; if no decision with in three days we will refax.  If another three days goes by with no decision will call the insurance for status.

## 2025-06-27 ENCOUNTER — TELEPHONE (OUTPATIENT)
Dept: ENDOCRINOLOGY | Age: 75
End: 2025-06-27

## 2025-06-27 NOTE — TELEPHONE ENCOUNTER
Attempted to call patient [phone], bladimir and stated your call can't be completed at this time, unable to leave a voice message. Patient needs give consent to [facility] to ship prolia.

## 2025-07-09 ENCOUNTER — TELEPHONE (OUTPATIENT)
Dept: ENDOCRINOLOGY | Age: 75
End: 2025-07-09

## 2025-07-09 NOTE — TELEPHONE ENCOUNTER
Patient stated she did give permission to ship prolia from [facility], estimated delivery 7/10/25.

## 2025-07-15 ENCOUNTER — HOSPITAL ENCOUNTER (OUTPATIENT)
Dept: GENERAL RADIOLOGY | Age: 75
Discharge: HOME | End: 2025-07-15
Payer: MEDICARE

## 2025-07-15 ENCOUNTER — OFFICE VISIT (OUTPATIENT)
Dept: ENDOCRINOLOGY | Age: 75
End: 2025-07-15
Payer: MEDICARE

## 2025-07-15 VITALS — WEIGHT: 170.6 LBS | BODY MASS INDEX: 31.39 KG/M2 | HEIGHT: 62 IN

## 2025-07-15 DIAGNOSIS — N20.0 KIDNEY STONE: ICD-10-CM

## 2025-07-15 DIAGNOSIS — Z51.81 MEDICATION MONITORING ENCOUNTER: ICD-10-CM

## 2025-07-15 DIAGNOSIS — M81.0 OSTEOPOROSIS, POSTMENOPAUSAL: ICD-10-CM

## 2025-07-15 DIAGNOSIS — M81.0 OSTEOPOROSIS, POSTMENOPAUSAL: Primary | ICD-10-CM

## 2025-07-15 PROCEDURE — 1123F ACP DISCUSS/DSCN MKR DOCD: CPT | Performed by: INTERNAL MEDICINE

## 2025-07-15 PROCEDURE — 99214 OFFICE O/P EST MOD 30 MIN: CPT | Performed by: INTERNAL MEDICINE

## 2025-07-15 PROCEDURE — 96372 THER/PROPH/DIAG INJ SC/IM: CPT | Performed by: INTERNAL MEDICINE

## 2025-07-15 PROCEDURE — 77080 DXA BONE DENSITY AXIAL: CPT

## 2025-07-15 PROCEDURE — 1159F MED LIST DOCD IN RCRD: CPT | Performed by: INTERNAL MEDICINE

## 2025-07-15 PROCEDURE — 77080 DXA BONE DENSITY AXIAL: CPT | Performed by: INTERNAL MEDICINE

## 2025-07-15 NOTE — PROGRESS NOTES
[facility] OSTEOPOROSIS AND BONE HEALTH SERVICES  [address]  [facility] [address]  Phone [phone]  Fax [phone]    NAME: SHANON TORRES  YOB: 1950  INITIAL CONSULTATION: 11/02/2011  MOST RECENT VISIT: 06/26/2024  TODAY'S VISIT:  07/15/2025    Labs @ [place] 11/2024    PROBLEMS:   Osteoporosis by DXA 06/2011, lowest T-score -2.5 in the right femoral neck    Low bone density by DXA 07/2005, lowest T-score -2.2 in the right femoral neck    BMD decreased 7283-2859, may be due to a technical error    No fractures    Natural menopause age 51 (2001)  Breast cancer diagnosed 2004, surgery, chemotherapy, radiation, Arimidex 2005~2017  Kidney stones x 5, first episode ~ age 49 (1999), last episode ~ 2003 11/2011 U Ca 289* oxalate 20, citrate 1005 uric acid 663*  NEW 2021/2022 - right hip pain, probably sciatica     CURRENT MANAGEMENT FOR BONE HEALTH:   Calcium: 900 mg/d diet + ~300 mg in multivitamin = ~1200 mg daily  Vitamin D:  2000 IU daily per Dr. Wilson    25-OH D 45 ng/mL 02/2022  Exercise: walks 3 x weekly  Pharmacologic therapy: Prolia 60 mg SQ twice yearly started 08/2013 (missed 01/2024), resumed 06/2024    PREVIOUS MEDICATIONS FOR OSTEOPOROSIS:   Fosamax, Fosamax D 7307-1398, changed to convenient dosing  Boniva 2005-06/2011, recommended to stop by oncologist  Ergocalciferol 50,000 IU, started \"years ago,” mostly monthly dosing, changed to cholecalciferol 2000 IU/d 04/2012 by Dr. Wilson  Alendronate 70 mg weekly 11/2011-02/2012, stopped because of GI symptoms  Tried alendronate 70 mg tabs dissolved in water, 04/2012, 3 doses => GI problems    OTHER CURRENT MEDICATIONS (SELECTED):  Lovastatin, Prilosec, Arimidex, gabapentin, bupropion, Singulair  OTC MEDICATIONS:  Folic acid, ASA    CHIEF COMPLAINT:  Here for f/u of osteoporosis, use of aromatase inhibitor and kidney stones, monitoring treatment.  No new related signs or symptoms.    INTERVAL HISTORY:  See problem